# Patient Record
(demographics unavailable — no encounter records)

---

## 2024-12-06 NOTE — ED.PDOC
OB/GYN


HPI Comments


28-year-old female who came to ER for vaginal bleeding.  Patient is a , 

approximately 5-6 weeks pregnant.  Patient states she had intercourse with her 

partner yesterday and shortly after she has been having vaginal spotting.  Noted

pinkish discharge whenever wipes.  Denies any acute vaginal bleeding or lower 

abdominal pains/cramping.  Denies any fever.


Chief Complaint:  Vaginal Bleed


Time Seen by MD:  21:43


Reviewed Notes:  Nurses Notes


Allergies:  


Coded Allergies:  


     NO KNOWN ALLERGIES (Unverified , 20)


Information Source:  Patient


Mode of Arrival:  Ambulatory


Timing:  Hours


Severity:  Moderate


Vaginal Discharge:  None


Vaginal Lesions:  None


Bleeding Quality:  Bright Red


Vaginal Mass:  None


Onset Of Mass/Bleeding:  Following [place]


Sexual Activity:  Sexually Active, Pregnant


Birth Control:  None


History of:  Current Pregnancy


Associated Signs and Symptoms:  Vaginal Bleeding





Past Medical History


PAST MEDICAL HISTORY:  Denies


Surgical History:  Denies all surgeries


GYN History:  No Pertinent GYN History





3


Para


1


LMP


10/15/2024





Family History


Family History:  Reviewed,noncontributory to illness, Family hx of Kidney shad





Social History


Smoker:  Non-Smoker


Alcohol:  Denies ETOH Use


Drugs:  Denies Drug Use


Lives In:  Home





Constitutional:  denies: chills, diaphoresis, fatigue, fever, malaise, sweats, 

weakness, others


Respiratory:  denies: cough, hemoptysis, orthopnea, SOB at rest, shortness of 

breath, SOB with excertion, stridor, wheezing, others


Cardiovascular:  denies: chest pain, dizzy spells, diaphoresis, Dyspnea on 

exertion, edema, irregular heart beat, left arm pain, lightheadedness, 

palpitations, PND, syncope, others


Gastrointestinal:  denies: abdomen distended, abdominal pain, blood streaked 

bowels, constipated, diarrhea, dysphagia, difficulty swallowing, hematemesis, 

melena, nausea, poor appetite, poor fluid intake, rectal bleeding, rectal pain, 

vomiting, others


Genitourinary:  reports: abnormal vagina bleeding; denies: burning, dyspareunia,

dysuria, flank pain, frequency, hematuria, incontinence, pain, pregnant, vagina 

discharge, urgency, others


Neurological:  denies: dizziness, fainting, headache, left sided numbness, left 

sided weakness, numbness, paresthesia, pre-existing deficit, right sided 

numbness, right sided weakness, seizure, speech problems, tingling, tremors, 

weakness, others


Musculoskeletal:  denies: back pain, gout, joint pain, joint swelling, muscle 

pain, muscle stiffness, neck pain, others


Integumetry:  denies: bruises, change in color, change in hair/nails, dryness, 

laceration, lesions, lumps, rash, wounds, others


Allergic/Immunocompromised:  denies: Difficulty Healing, Frequent Infections, 

Hives, Itching, others


Hematologic/Lymphatic:  denies: anemia, blood clots, easy bleeding, easy 

bruising, swollen glands, others


Endocrine:  denies: excessive hunger, excessive sweating, excessive thirst, 

excessive urination, flushing, intolerance to cold, intolerance to heat, 

unexplained weight gain, unexplained weight loss, others


Psychiatric:  denies: anxiety, bipolar disorder, depression, hopeless, panic 

disorder, schizophrenia, sleepless, suicidal, others





Physical Exam


General Appearance:  No Apparent Distress, Normal


HEENT:  Normal ENT Inspection, Pharynx Normal, TMs Normal


Neck:  Full Range of Motion, Non-Tender, Normal, Normal Inspection


Respiratory:  Chest Non-Tender, Lungs Clear, No Accessory Muscle Use, No 

Respiratory Distress, Normal Breath Sounds


Cardiovascular:  No Edema, No JVD, No Murmur, No Gallop, Normal Peripheral 

Pulses, Regular Rate/Rhythm


Breast Exam:  Deferred


Gastrointestinal:  No Organomegaly, Non Tender, No Pulsatile Mass, Normal Bowel 

Sounds, Soft


Genitalia:  Deferred


Pelvic:  Deferred


Rectal:  Deferred


Extremities:  No calf tenderness, Normal capillary refill, Normal inspection, 

Normal range of motion, Non-tender, No pedal edema


Musculoskeletal :  


   Apperance:  Normal


Neurologic:  Alert, CNs II-XII nml as Tested, No Motor Deficits, Normal Affect, 

Normal Mood, No Sensory Deficits


Cerebellar Function:  Normal


Reflexes:  Normal


Skin:  Dry, Normal Color, Warm


Lymphatic:  No Adenopathy





Was a procedure done?


Was a procedure done?:  No





Differential Diagnosis (GYN)


Vaginal Bleeding:   - Missed,  - Threatened, Blood Loss Anemia, 

Menstrual Bleeding, UTI





X-Ray, Labs, Meds, VS





                                   Vital Signs








  Date Time  Temp Pulse Resp B/P (MAP) Pulse Ox O2 Delivery O2 Flow Rate FiO2


 


24 21:25 97.3 113 18 136/84 (101) 97   








                                       Lab








Test


 24


21:40 24


21:29 Range/Units


 


 


White Blood Count


 7.0 


 


 4.4-10.8


10^3/uL


 


Red Blood Count


 4.62 


 


 4.0-5.20


10^6/uL


 


Hemoglobin 13.9   12.2-16.2  g/dL


 


Hematocrit 41.3   36.0-46.0  %


 


Mean Corpuscular Volume 89.3   80.0-100.0  fL


 


Mean Corpuscular Hemoglobin 30.1   28.0-32.0  pg


 


Mean Corpuscular Hemoglobin


Concent 33.7 


 


 32.0-36.0  g/dL





 


Red Cell Distribution Width 12.8   11.8-14.3  %


 


Platelet Count


 317 


 


 140-450


10^3/uL


 


Mean Platelet Volume 8.2   6.9-10.8  fL


 


Neutrophils (%) (Auto) 55.0   37.0-80.0  %


 


Lymphocytes (%) (Auto) 34.8   10.0-50.0  %


 


Monocytes (%) (Auto) 9.3   0.0-12.0  %


 


Eosinophils (%) (Auto) 0.2   0.0-7.0  %


 


Basophils (%) (Auto) 0.7   0.0-2.0  %


 


Neutrophils # (Auto)


 3.8 


 


 1.6-8.6  10


^3/uL


 


Lymphocytes # (Auto)


 2.4 


 


 0.4-5.4  10


^3/uL


 


Monocytes # (Auto) 0.6   0-1.3  10 ^3/uL


 


Eosinophils # (Auto) 0   0-0.8  10 ^3/uL


 


Basophils # (Auto) 0   0-0.2  10 ^3/uL


 


Nucleated Red Blood Cells 0.2    %


 


Sodium Level 133 L  136-145  mmol/L


 


Potassium Level 4.2   3.5-5.1  mmol/L


 


Chloride Level 101     mmol/L


 


Carbon Dioxide Level 24   20-31  mmol/L


 


Anion Gap 8   5-15  


 


Blood Urea Nitrogen 7 L  9-23  mg/dL


 


Creatinine


 0.70 


 


 0.550-1.02


mg/dL


 


Glomerular Filtration Rate


Calc 121 


 


 >90  mL/min





 


BUN/Creatinine Ratio 10.0   10.0-20.0  


 


Serum Glucose 509 *H    mg/dL


 


Calcium Level 10.2   8.7-10.4  mg/dL


 


Beta HCG, Quantitative 139.1 H  1.5-4.2  mIU/mL


 


Urine Color  Light-yellow  Yellow  


 


Urine Clarity  Clear  Clear  


 


Urine pH  5.5  5.0-9.0  


 


Urine Specific Gravity  1.040 H 1.001-1.035  


 


Urine Protein  Negative  Negative  


 


Urine Ketones  Trace  Negative  


 


Urine Blood  2+ H Negative  /uL


 


Urine Nitrite  Negative  Negative  


 


Urine Bilirubin  Negative  Negative  


 


Urine Urobilinogen  Normal  Negative  mg/dL


 


Urine Leukocyte Esterase  Negative  Negative  /uL


 


Urine RBC  9  0 - 4  /hpf


 


Urine WBC  4  0 - 5  /hpf


 


Urine Squamous Epithelial


Cells 


 Few 


 <5  /hpf





 


Urine Bacteria  None seen  None Seen  /hpf


 


Urine Glucose  4+ H Normal  mg/dL








Time of 1ST Reevaluation:  21:41


Reevaluation 1ST:  Unchanged


Patient Education/Counseling:  Diagnosis, Treatment


Family Education/Counseling:  No Family Present





Departure 1


Departure


Time of Disposition:  05:57 (Patient likely miscarried however she is 

hyperglycemic.  Want to discuss with the patient and give medications however 

patient eloped.)


Impression:  


   Primary Impression:  


   Spontaneous miscarriage


   Additional Impression:  


   Uncontrolled diabetes mellitus


   Qualified Codes:  E11.65 - Type 2 diabetes mellitus with hyperglycemia


Disposition:  07 LEFT AWOL/ELOPED


Condition:  Serious





Critical Care Note


Critical Care Time?:  No





Stability


Stability form required:  No





Heart Score


Heart Score:  








Heart Score Response (Comments) Value


 


History N/A 0


 


EKG N/A 0


 


Age N/A 0


 


Risk Factors N/A 0


 


Troponin N/A 0


 


Total  0














I personally scribed for SACHI EPPS MD ([email or web address]) on 24 at 21:44.  

Electronically submitted by Vidal Winters ([email or web address]).





SACHI EPPS MD                Dec 6, 2024 21:44

## 2024-12-07 NOTE — DVH
OB ULTRASOUND <14 WEEKS: 



HISTORY: VAGINAL BLEEDING, HETA-HC



TECHNIQUE:  Multiple real-time grayscale sonographic images of the pelvis with duplex Doppler color f
low, spectral and M-mode analysis. 



TRANSDUCERS: C1-6



COMPARISON: None



FINDINGS:



The uterus measures  10 x 6.1 x 5.4 cm



The cervix closed



Right ovary measures 3.5 x 2.2 x 2.9 cm with normal Doppler color flow. Cystic structure in the right
 ovary with peripheral vascularity measuring up to 1.8 cm, possibly corpus luteal cyst.



Left ovary measures 2.3 x 1.8 x 1.2 cm with normal Doppler color flow.



Questionable small intrauterine gestational sac measuring up to 0.31 cm. No fetal pole seen at this t
vero.



IMPRESSION: 



1. Questionable small intrauterine gestational sac measuring up to 0.31 cm. No fetal pole seen at thi
s time.



2. Cystic structure in the right ovary with peripheral vascularity measures up to 1.8 cm, possibly co
rpus luteal cyst



Electronically Signed by: Justin Romano at 2024 12:28:17 PM

## 2024-12-07 NOTE — ED.PDOC
OB/GYN


HPI Comments


A 28 YEAR OLD FEMALE PRESENTS TO THE ED WITH COMPLAINT OF VAGINAL SPOTTING 

DURING PREGNANCY AND HYPERGLYCEMIA. PATIENT STATES SHE IS CURRENTLY ABOUT 4-5 

WEEKS PREGNANT AND HAS BEEN EXPERIENCING VAGINAL SPOTTING THAT STARTED 

YESTERDAY. PATIENT NOTES SHE CAME TO THIS ED FOR THE SAME COMPLAINT YESTERDAY 

WHERE BLOOD TEST WERE DONE WHICH SHOWED A BETA HCG QUANT .1, AND A  BLOOD 

GLUCOSE LEVEL , BUT ELOPED PRIOR TO RECEIVING TREATMENT AND PRIOR TO 

DISCHARGE.  PATIENT REPORTS HER BLEEDING WORSENED TODAY, PROMPTING HER TO COME 

BACK TO THE ED FOR EVALUATION. PATIENT NOTED SHE WAS ALREADY DIAGNOSED WITH 

DIABETES BY HER PCP, BUT WAS NEVER PRESCRIBED ANY MEDICATION. PATIENT DENIES 

DYSURIA, HEMATURIA, VAGINAL DISCHARGE, FEVER, CHILLS, SHORTNESS OF BREATH, CHEST

PAIN, ABDOMINAL PAIN, NAUSEA, VOMITING, HEADACHE, OR OTHER COMPLAINTS. NO OTHER 

SYMPTOMS OR MODIFYING FACTORS AT THIS TIME. PATIENT IS ALERT, ORIENTED X 4, AND 

HAS STEADY GAIT.


Time Seen by MD:  09:10


Reviewed Notes:  Nurses Notes, Medications, Allergies


Allergies:  


Coded Allergies:  


     NO KNOWN ALLERGIES (Unverified , 20)


Information Source:  Patient


Mode of Arrival:  Ambulatory


Timing:  Days


Prehospital treatment:  None


Vaginal Discharge:  None


Vaginal Lesions:  None


Bleeding Quality:  Bright Red


Vaginal Mass:  None


Onset Of Mass/Bleeding:  Spontaneous


Sexual Activity:  Pregnant


Last Consensual [place]:  Unknown


Birth Control:  None


History of:  Current Pregnancy


Blood Type:  Unknown


Symptoms of Possible Pregnancy:  None


Associated Signs and Symptoms:  Vaginal Bleeding, Cramping





Past Medical History


PAST MEDICAL HISTORY:  DM


Surgical History:  Denies all surgeries


GYN History:  No Pertinent GYN History





Family History


Family History:  Reviewed,noncontributory to illness, Family hx of Kidney shad





Social History


Smoker:  Non-Smoker


Alcohol:  Denies ETOH Use


Drugs:  Denies Drug Use


Lives In:  Home





Constitutional:  denies: chills, diaphoresis, fatigue, fever, malaise, sweats, 

weakness, others


EENTM:  denies: blurred vision, double vision, ear bleeding, ear discharge, ear 

drainage, ear pain, ear ringing, eye pain, eye redness, hearing loss, mouth 

pain, mouth swelling, nasal discharge, nose bleeding, nose congestion, nose 

pain, photophobia, tearing, throat pain, throat swelling, voice changes, others


Respiratory:  denies: cough, hemoptysis, orthopnea, SOB at rest, shortness of 

breath, SOB with excertion, stridor, wheezing, others


Cardiovascular:  denies: chest pain, dizzy spells, diaphoresis, Dyspnea on 

exertion, edema, irregular heart beat, left arm pain, lightheadedness, 

palpitations, PND, syncope, others


Gastrointestinal:  denies: abdomen distended, abdominal pain, blood streaked 

bowels, constipated, diarrhea, dysphagia, difficulty swallowing, hematemesis, 

melena, nausea, poor appetite, poor fluid intake, rectal bleeding, rectal pain, 

vomiting, others


Genitourinary:  reports: abnormal vagina bleeding, pregnant; denies: burning, 

dyspareunia, dysuria, flank pain, frequency, hematuria, incontinence, pain, 

vagina discharge, urgency, others


Neurological:  denies: dizziness, fainting, headache, left sided numbness, left 

sided weakness, numbness, paresthesia, pre-existing deficit, right sided 

numbness, right sided weakness, seizure, speech problems, tingling, tremors, 

weakness, others


Musculoskeletal:  denies: back pain, gout, joint pain, joint swelling, muscle 

pain, muscle stiffness, neck pain, others


Integumetry:  denies: bruises, change in color, change in hair/nails, dryness, 

laceration, lesions, lumps, rash, wounds, others


Allergic/Immunocompromised:  denies: Difficulty Healing, Frequent Infections, 

Hives, Itching, others


Hematologic/Lymphatic:  denies: anemia, blood clots, easy bleeding, easy 

bruising, swollen glands, others


Endocrine:  reports: others (HYPERGLYCEMIA); denies: excessive hunger, excessive

sweating, excessive thirst, excessive urination, flushing, intolerance to cold, 

intolerance to heat, unexplained weight gain, unexplained weight loss


Psychiatric:  denies: anxiety, bipolar disorder, depression, hopeless, panic 

disorder, schizophrenia, sleepless, suicidal, others


All Other Systems:  Reviewed and Negative





Physical Exam


General Appearance:  No Apparent Distress, Normal


HEENT:  Normal ENT Inspection, PERRL/EOMI, Pharynx Normal, TMs Normal


Neck:  Full Range of Motion, Non-Tender, Normal, Normal Inspection


Respiratory:  Chest Non-Tender, Lungs Clear, No Accessory Muscle Use, No 

Respiratory Distress, Normal Breath Sounds


Cardiovascular:  No Edema, No JVD, No Murmur, No Gallop, Normal Peripheral 

Pulses, Regular Rate/Rhythm


Breast Exam:  Deferred


Gastrointestinal:  No Organomegaly, Non Tender, No Pulsatile Mass, Normal Bowel 

Sounds, Soft


Genitalia:  Deferred


Pelvic:  Normal External Exam, Vaginal Bleeding (MILD VAGINAL BLEEDING, NO 

ACTIVELY VAGINAL BLEEDING AND BLOOD CLOTS. )


Rectal:  Deferred


Extremities:  No calf tenderness, Normal capillary refill, Normal inspection, 

Normal range of motion, Non-tender, No pedal edema


Musculoskeletal :  


   Apperance:  Normal


Neurologic:  Alert, CNs II-XII nml as Tested, No Motor Deficits, Normal Affect, 

Normal Mood, No Sensory Deficits


Cerebellar Function:  Normal


Reflexes:  Normal


Skin:  Dry, Normal Color, Warm


Peripheral Pulses:  2+ carotid (R), 2+ carotid (L)


Lymphatic:  No Adenopathy





Was a procedure done?


Was a procedure done?:  No





Differential Diagnosis (GYN)


Vaginal Bleeding:   - Complete,  - Incomplete,  - 

Missed,  - Threatened, UTI, Vaginitis


Mass / Lesion:  N/A


Vaginal Discharge:  N/A





X-Ray, Labs, Meds, VS





                                   Vital Signs








  Date Time  Temp Pulse Resp B/P (MAP) Pulse Ox O2 Delivery O2 Flow Rate FiO2


 


24 09:27 98.3 111 17 122/77 (92) 100   


 


24 09:25  111 17  100 Room Air  


 


24 09:25 98.3 111 17 122/77 (92) 100   





 98.3       








                                       Lab








Test


 24


12:24 24


11:10 24


10:28 24


09:44 Range/Units


 


 


POC Glucose 226 H 286 H 331 H    mg/dl


 


White Blood Count


 


 


 


 6.1 


 4.4-10.8


10^3/uL


 


Red Blood Count


 


 


 


 4.69 


 4.0-5.20


10^6/uL


 


Hemoglobin    14.4  12.2-16.2  g/dL


 


Hematocrit    41.6  36.0-46.0  %


 


Mean Corpuscular Volume    88.6  80.0-100.0  fL


 


Mean Corpuscular Hemoglobin    30.6  28.0-32.0  pg


 


Mean Corpuscular Hemoglobin


Concent 


 


 


 34.6 


 32.0-36.0  g/dL





 


Red Cell Distribution Width    12.8  11.8-14.3  %


 


Platelet Count


 


 


 


 311 


 140-450


10^3/uL


 


Mean Platelet Volume    8.2  6.9-10.8  fL


 


Neutrophils (%) (Auto)    63.5  37.0-80.0  %


 


Lymphocytes (%) (Auto)    27.4  10.0-50.0  %


 


Monocytes (%) (Auto)    8.1  0.0-12.0  %


 


Eosinophils (%) (Auto)    0.3  0.0-7.0  %


 


Basophils (%) (Auto)    0.7  0.0-2.0  %


 


Neutrophils # (Auto)


 


 


 


 3.9 


 1.6-8.6  10


^3/uL


 


Lymphocytes # (Auto)


 


 


 


 1.7 


 0.4-5.4  10


^3/uL


 


Monocytes # (Auto)    0.5  0-1.3  10 ^3/uL


 


Eosinophils # (Auto)    0  0-0.8  10 ^3/uL


 


Basophils # (Auto)    0  0-0.2  10 ^3/uL


 


Nucleated Red Blood Cells    0.1   %


 


Sodium Level    136  136-145  mmol/L


 


Potassium Level    4.1  3.5-5.1  mmol/L


 


Chloride Level    102    mmol/L


 


Carbon Dioxide Level    23  20-31  mmol/L


 


Anion Gap    11  5-15  


 


Blood Urea Nitrogen    < 5 L 9-23  mg/dL


 


Creatinine


 


 


 


 0.63 


 0.550-1.02


mg/dL


 


Glomerular Filtration Rate


Calc 


 


 


 124 


 >90  mL/min





 


BUN/Creatinine Ratio    7.9 L 10.0-20.0  


 


Serum Glucose    335 H   mg/dL


 


Calcium Level    9.9  8.7-10.4  mg/dL


 


Beta HCG, Quantitative    162.2 H 1.5-4.2  mIU/mL


 


Test


 24


09:20 24


09:18 


 


 Range/Units


 


 


Urine Color Yellow     Yellow  


 


Urine Clarity Turbid H    Clear  


 


Urine pH 5.5     5.0-9.0  


 


Urine Specific Gravity 1.041 H    1.001-1.035  


 


Urine Protein Trace H    Negative  


 


Urine Ketones 3+ H    Negative  


 


Urine Blood 3+ H    Negative  /uL


 


Urine Nitrite Negative     Negative  


 


Urine Bilirubin Negative     Negative  


 


Urine Urobilinogen Normal     Negative  mg/dL


 


Urine Leukocyte Esterase Trace     Negative  /uL


 


Urine      0 - 4  /hpf


 


Urine WBC 29     0 - 5  /hpf


 


Urine Squamous Epithelial


Cells Mod 


 


 


 


 <5  /hpf





 


Urine Bacteria Few H    None Seen  /hpf


 


Urine Glucose 4+ H    Normal  mg/dL


 


POC Glucose  381 H     mg/dl








                               Current Medications








 Medications


  (Trade)  Dose


 Ordered  Sig/Neema


 Route  Start Time


 Stop Time Status Last Admin


 


 Sodium Chloride  1,000 ml @ 


 1,000 mls/hr  Q1H ONCE


 IV  24 09:30


 24 10:29 DC 24 09:30





 


 Sodium Chloride  1,000 ml @ 


 1,000 mls/hr  Q1H ONCE


 IV  24 10:45


 24 11:44 DC 24 10:43








OB ULTRASOUND <14 WEEKS: 





HISTORY: VAGINAL BLEEDING, HETA-HC





TECHNIQUE:  Multiple real-time grayscale sonographic images of the pelvis with 

duplex Doppler color flow, spectral and M-mode analysis. 





TRANSDUCERS: C1-6





COMPARISON: None





FINDINGS:





The uterus measures  10 x 6.1 x 5.4 cm





The cervix closed





Right ovary measures 3.5 x 2.2 x 2.9 cm with normal Doppler color flow. Cystic 

structure in the right ovary with peripheral vascularity measuring up to 1.8 cm,

possibly corpus luteal cyst.





Left ovary measures 2.3 x 1.8 x 1.2 cm with normal Doppler color flow.





Questionable small intrauterine gestational sac measuring up to 0.31 cm. No 

fetal pole seen at this time.





IMPRESSION: 





1. Questionable small intrauterine gestational sac measuring up to 0.31 cm. No 

fetal pole seen at this time.





2. Cystic structure in the right ovary with peripheral vascularity measures up 

to 1.8 cm, possibly corpus luteal cyst





Electronically Signed by: Manuel Albrecht at 2024 12:28:17 PM











DICTATED BY: MANUEL ALBRECHT MD


DICTATED DATE/TIME: 24





SIGNED BY: MANUEL ALBRECHT MD


SIGNED DATE/TIME: 24





CC: 


X-Ray, Labs, Meds, VS Comment


EXTERNAL NOTES: NONE 





LABS ORDERED: CBC, BMP, UA, BETA HCG QUANT  


REVIEWED AND INTERPRETED RESULTS: BETA .2, GLUC 335, KET 3+, BLOOD 3+, 

UGLU 4+  





PATIENT'S BETA HCG QUANT WAS NOTED TO HAVE INCREASED FROM 139.1 YESTERDAY TO 

162.2 TODAY.





IMAGING ORDERED: 


US OB < 14 WKS





INDEPENDENT HISTORIANS: NONE





TREATMENTS ORDERED: NS 2L IV, INSULIN 6 UNITS IV





PATIENT'S CASE AND RESULTS HAVE BEEN DISCUSSED WITH THE ED ATTENDING PHYSICIAN 

AND THEY AGREE WITH MY PLAN OF CARE.





I HAVE DISCUSSED IMAGING AND LAB RESULTS WITH PATIENT AND HAVE INSTRUCTED THEM 

TO FOLLOW UP WITH THEIR PCP IN 1-2 DAYS. THE PATIENT FULLY UNDERSTANDS THEIR 

RESULTS AND IS AWARE THEY NEED TO FOLLOW UP WITH THEIR PCP FOR FURTHER 

EVALUATION IF THEIR SYMPTOMS PERSIST.


Images Reviewed?:  Images reviewed and evaluated by me


Time of 1ST Reevaluation:  12:43


Reevaluation 1ST:  Improved


Patient Education/Counseling:  Diagnosis, Treatment, Need For Follow Up


Family Education/Counseling:  Diagnosis, Treatment, Need For Follow Up


Medical Screening:  No EMC Exist At This Time





Departure 1


Departure


Time of Disposition:  12:44


Impression:  


   Primary Impression:  


   Vaginal bleeding


   Additional Impressions:  


   Threatened  in early pregnancy


   Uncontrolled diabetes mellitus


   Qualified Codes:  E11.65 - Type 2 diabetes mellitus with hyperglycemia


Disposition:  01 HOME / SELF CARE / HOMELESS


Condition:  Stable





Additional Instructions:  


FOLLOW-UP WITH PCP AND OB/GYN IN 1 TO 2 DAYS.  TAKE MEDICATIONS AS PRESCRIBED.  

RETURN TO ED FOR ANY NEW OR WORSENING SYMPTOMS.


Discharged With:  Self





Critical Care Note


Critical Care Time?:  No





Stability


Stability form required:  No








I personally scribed for DIPAK PATRICK ([email or web address]) on 24 at 09:27.  

Electronically submitted by Joey Plaza ([email or web address]).


I personally scribed for DIPAK PATRICK PA ([email or web address]) on 24 at 11:05.  

Electronically submitted by Joey Plaza ([email or web address]).


I personally scribed for DIPAK PATRICK PA ([email or web address]) on 24 at 11:08.  

Electronically submitted by Joey Plaza (Zazum).


I personally scribed for ANITHA PATRICKA PA ([email or web address]) on 24 at 12:32.  

Electronically submitted by Joey Plaza (Zazum).


I personally scribed for ANITHA PATRICKA PA ([email or web address]) on 24 at 12:39.  

Electronically submitted by Joey Plaza ([email or web address]).





DIPAK PATRICK                  Dec 7, 2024 09:27